# Patient Record
Sex: FEMALE | Race: WHITE | URBAN - METROPOLITAN AREA
[De-identification: names, ages, dates, MRNs, and addresses within clinical notes are randomized per-mention and may not be internally consistent; named-entity substitution may affect disease eponyms.]

---

## 2018-11-16 ENCOUNTER — OFFICE VISIT (OUTPATIENT)
Dept: PRIMARY CARE CLINIC | Age: 83
End: 2018-11-16

## 2018-11-16 VITALS
TEMPERATURE: 97.4 F | BODY MASS INDEX: 21.54 KG/M2 | SYSTOLIC BLOOD PRESSURE: 138 MMHG | OXYGEN SATURATION: 98 % | DIASTOLIC BLOOD PRESSURE: 72 MMHG | HEART RATE: 70 BPM | HEIGHT: 58 IN | WEIGHT: 102.6 LBS | RESPIRATION RATE: 16 BRPM

## 2018-11-16 DIAGNOSIS — Z71.89 ACP (ADVANCE CARE PLANNING): ICD-10-CM

## 2018-11-16 DIAGNOSIS — R73.02 IGT (IMPAIRED GLUCOSE TOLERANCE): ICD-10-CM

## 2018-11-16 DIAGNOSIS — Z00.00 MEDICARE ANNUAL WELLNESS VISIT, SUBSEQUENT: Primary | ICD-10-CM

## 2018-11-16 DIAGNOSIS — E78.2 MIXED HYPERLIPIDEMIA: ICD-10-CM

## 2018-11-16 RX ORDER — SIMVASTATIN 20 MG/1
TABLET, FILM COATED ORAL
COMMUNITY
End: 2018-11-21 | Stop reason: SDUPTHER

## 2018-11-16 NOTE — PROGRESS NOTES
Florencio Choi is a 82 y.o. female and presents for Annual Medicare Wellness Visit. Assessment of cognitive impairment: Alert and oriented x 3 [facility] Depression Screen: PHQ over the last two weeks 2018 Little interest or pleasure in doing things Not at all Feeling down, depressed, irritable, or hopeless Not at all Total Score PHQ 2 0 Fall Risk Assessment:   
Fall Risk Assessment, last 12 mths 2018 Able to walk? Yes Fall in past 12 months? No  
Fall with injury? -  
Number of falls in past 12 months - Fall Risk Score -  
 
 
Abuse Screen:  
Abuse Screening Questionnaire 2018 Do you ever feel afraid of your partner? Marianne Portal Are you in a relationship with someone who physically or mentally threatens you? Marianne Portal Is it safe for you to go home? Senia Baldwin Activities of Daily Living: 
Partial assistance. Requires assistance with: driving Patient handle his/her own medications Use of pill box  yes Activities of Daily Living: ADL Assessment 2018 Feeding yourself No Help Needed Getting from bed to chair No Help Needed Getting dressed No Help Needed Bathing or showering No Help Needed Walk across the room (includes cane/walker) No Help Needed Using the telphone No Help Needed Taking your medications No Help Needed Preparing meals No Help Needed Managing money (expenses/bills) No Help Needed Moderately strenuous housework (laundry) No Help Needed Shopping for personal items (toiletries/medicines) No Help Needed Shopping for groceries No Help Needed Driving Help Needed Climbing a flight of stairs No Help Needed Getting to places beyond walking distances No Help Needed Health Maintenance:Daily Aspirin: no and recommended to start daily 81mg Bone Density: never done Glaucoma Screenin Immunizations:  
 Tetanus: declined  Influenza: declined Shingles: declined  PPSV-23: declined  Prevnar-13: declined Cancer screening: Cervical:n/a  Breast: refused. Colon: patient declined  Prostate: n/a Alcohol Risk Screen: On any occasion during the past 3 months, have you had more than 3 drinks(female) or 4 drinks (male) containing alcohol in one?  no 
Do you average more than 7 drinks (female) or 14 drinks (male) per week?  no 
Type and amount:none Hearing Loss:Has hearing aids but doesn't wear them Vision Loss:  
Wears glasses, contact lenses, or have any other visual impairment  Wears glasses Adult Nutrition Screen: No risk factors noted. Advance Care Planning: End of Life Planning: has NO advanced directive  - paperwork given SergoAnai Kenneymarine Mejia ACP-Facilitator appointment Medications/Allergies: Reviewed with patient Prior to Admission medications Medication Sig Start Date End Date Taking? Authorizing Provider  
simvastatin (ZOCOR) 20 mg tablet Take  by mouth nightly. Yes Provider, Historical  
hydrocortisone (HYTONE) 2.5 % ointment Apply  to affected area two (2) times a day. use thin layer 6/28/16   Michael Espinoza MD  
ketoconazole (NIZORAL) 2 % topical cream Apply  to affected area daily. 11/25/14   Apolinar Luz NP  
OMEGA-3 FATTY ACIDS (OMEGA 3 PO) Take 1 Tab by mouth daily. 9/22/10   Provider, Historical  
 
 
No Known Allergies Objective: 
Visit Vitals /72 (BP 1 Location: Left arm, BP Patient Position: Sitting) Pulse 70 Temp 97.4 °F (36.3 °C) (Oral) Resp 16 Ht 4' 10\" (1.473 m) Wt 102 lb 9.6 oz (46.5 kg) SpO2 98% BMI 21.44 kg/m² Body mass index is 21.44 kg/m². Problem List: Reviewed with patient and discussed risk factors. Patient Active Problem List  
Diagnosis Code  Hyperlipidemia E78.5  Recurrent UTI N39.0  
 
 
PSH: Reviewed with patient Past Surgical History:  
Procedure Laterality Date  HX APPENDECTOMY  HX GYN    
 hysterectomy SH: Reviewed with patient Social History Tobacco Use  Smoking status: Never Smoker  Smokeless tobacco: Never Used Substance Use Topics  Alcohol use: No  
 Drug use: No  
 
 
FH: Reviewed with patient Family History Family history unknown: Yes Current medical providers:   
Patient Care Team: 
Robert Keys MD as PCP - General 
Plan:   
Diagnoses and all orders for this visit: 
 
Medicare annual wellness visit, subsequent Immunization & Health screening discussed with him. Daughter is helping with translation as they refused blue phone. They refused all the immunizations & well aware of consequences. ACP (advance care planning) Paper work given . Recommended making appointment with [facility]. Mixed hyperlipidemia 
-     CBC W/O DIFF 
-     LIPID PANEL 
-     METABOLIC PANEL, COMPREHENSIVE 
 
IGT (impaired glucose tolerance) 
-     HEMOGLOBIN A1C WITH EAG Orders Placed This Encounter  simvastatin (ZOCOR) 20 mg tablet Health Maintenance Topic Date Due  
 DTaP/Tdap/Td series (1 - Tdap) 02/25/1958  Shingrix Vaccine Age 50> (1 of 2) 02/25/1987  GLAUCOMA SCREENING Q2Y  02/25/2002  Bone Densitometry (Dexa) Screening  02/25/2002  Pneumococcal 65+ Low/Medium Risk (1 of 2 - PCV13) 02/25/2002  MEDICARE YEARLY EXAM  03/14/2018  Influenza Age 5 to Adult  08/01/2018  COLONOSCOPY  07/05/2026 Urinary/ Fecal Incontinence: no 
 
Regular physical exercise: yes,walks daily. Patient verbalized understanding of information presented. AVS and Medicare Part B Preventive Services Table printed and given to pt and reviewed. See table for findings under Recommendation and Scheduled. All of the patient's questions were answered.

## 2018-11-17 LAB
ALBUMIN SERPL-MCNC: 4 G/DL (ref 3.5–4.7)
ALBUMIN/GLOB SERPL: 1.5 {RATIO} (ref 1.2–2.2)
ALP SERPL-CCNC: 102 IU/L (ref 39–117)
ALT SERPL-CCNC: 12 IU/L (ref 0–32)
AST SERPL-CCNC: 15 IU/L (ref 0–40)
BILIRUB SERPL-MCNC: 0.3 MG/DL (ref 0–1.2)
BUN SERPL-MCNC: 10 MG/DL (ref 8–27)
BUN/CREAT SERPL: 16 (ref 12–28)
CALCIUM SERPL-MCNC: 8.8 MG/DL (ref 8.7–10.3)
CHLORIDE SERPL-SCNC: 101 MMOL/L (ref 96–106)
CHOLEST SERPL-MCNC: 200 MG/DL (ref 100–199)
CO2 SERPL-SCNC: 28 MMOL/L (ref 20–29)
CREAT SERPL-MCNC: 0.64 MG/DL (ref 0.57–1)
ERYTHROCYTE [DISTWIDTH] IN BLOOD BY AUTOMATED COUNT: 14.5 % (ref 12.3–15.4)
EST. AVERAGE GLUCOSE BLD GHB EST-MCNC: 114 MG/DL
GLOBULIN SER CALC-MCNC: 2.6 G/DL (ref 1.5–4.5)
GLUCOSE SERPL-MCNC: 86 MG/DL (ref 65–99)
HBA1C MFR BLD: 5.6 % (ref 4.8–5.6)
HCT VFR BLD AUTO: 36.6 % (ref 34–46.6)
HDLC SERPL-MCNC: 63 MG/DL
HGB BLD-MCNC: 11.8 G/DL (ref 11.1–15.9)
LDLC SERPL CALC-MCNC: 116 MG/DL (ref 0–99)
MCH RBC QN AUTO: 28.2 PG (ref 26.6–33)
MCHC RBC AUTO-ENTMCNC: 32.2 G/DL (ref 31.5–35.7)
MCV RBC AUTO: 88 FL (ref 79–97)
PLATELET # BLD AUTO: 284 X10E3/UL (ref 150–379)
POTASSIUM SERPL-SCNC: 4 MMOL/L (ref 3.5–5.2)
PROT SERPL-MCNC: 6.6 G/DL (ref 6–8.5)
RBC # BLD AUTO: 4.18 X10E6/UL (ref 3.77–5.28)
SODIUM SERPL-SCNC: 140 MMOL/L (ref 134–144)
TRIGL SERPL-MCNC: 103 MG/DL (ref 0–149)
VLDLC SERPL CALC-MCNC: 21 MG/DL (ref 5–40)
WBC # BLD AUTO: 6.5 X10E3/UL (ref 3.4–10.8)

## 2018-11-18 NOTE — PROGRESS NOTES
Please let her daughter know her Cholesterol is going up. She needs to stay with one PCP for her care.

## 2018-11-21 ENCOUNTER — TELEPHONE (OUTPATIENT)
Dept: PRIMARY CARE CLINIC | Age: 83
End: 2018-11-21

## 2018-11-21 RX ORDER — SIMVASTATIN 20 MG/1
TABLET, FILM COATED ORAL
Qty: 90 TAB | Refills: 3 | Status: SHIPPED | OUTPATIENT
Start: 2018-11-21

## 2018-11-21 RX ORDER — SIMVASTATIN 20 MG/1
20 TABLET, FILM COATED ORAL
Qty: 30 TAB | Refills: 3 | Status: SHIPPED | OUTPATIENT
Start: 2018-11-21 | End: 2018-11-21 | Stop reason: SDUPTHER

## 2022-03-12 ENCOUNTER — HOSPITAL ENCOUNTER (EMERGENCY)
Age: 87
Discharge: HOME | End: 2022-03-12
Attending: EMERGENCY MEDICINE | Admitting: EMERGENCY MEDICINE
Payer: MEDICARE

## 2022-03-12 VITALS
HEIGHT: 60 IN | SYSTOLIC BLOOD PRESSURE: 119 MMHG | DIASTOLIC BLOOD PRESSURE: 90 MMHG | TEMPERATURE: 97.5 F | RESPIRATION RATE: 20 BRPM | BODY MASS INDEX: 20.03 KG/M2 | WEIGHT: 102 LBS | HEART RATE: 76 BPM | OXYGEN SATURATION: 96 %

## 2022-03-12 DIAGNOSIS — H81.392 PERIPHERAL VERTIGO, LEFT: Primary | ICD-10-CM

## 2022-03-12 LAB
ALBUMIN SERPL-MCNC: 3.6 G/DL (ref 3.5–5)
ALBUMIN/GLOB SERPL: 0.9 {RATIO} (ref 1.1–2.2)
ALP SERPL-CCNC: 112 U/L (ref 45–117)
ALT SERPL-CCNC: 17 U/L (ref 12–78)
ANION GAP SERPL CALC-SCNC: 2 MMOL/L (ref 5–15)
AST SERPL-CCNC: 8 U/L (ref 15–37)
ATRIAL RATE: 78 BPM
BASOPHILS # BLD: 0 K/UL (ref 0–0.1)
BASOPHILS NFR BLD: 0 % (ref 0–1)
BILIRUB SERPL-MCNC: 0.4 MG/DL (ref 0.2–1)
BUN SERPL-MCNC: 10 MG/DL (ref 6–20)
BUN/CREAT SERPL: 17 (ref 12–20)
CALCIUM SERPL-MCNC: 9 MG/DL (ref 8.5–10.1)
CALCULATED P AXIS, ECG09: 47 DEGREES
CALCULATED R AXIS, ECG10: 37 DEGREES
CALCULATED T AXIS, ECG11: 54 DEGREES
CHLORIDE SERPL-SCNC: 103 MMOL/L (ref 97–108)
CO2 SERPL-SCNC: 31 MMOL/L (ref 21–32)
COMMENT, HOLDF: NORMAL
CREAT SERPL-MCNC: 0.59 MG/DL (ref 0.55–1.02)
DIAGNOSIS, 93000: NORMAL
DIFFERENTIAL METHOD BLD: ABNORMAL
EOSINOPHIL # BLD: 0.1 K/UL (ref 0–0.4)
EOSINOPHIL NFR BLD: 1 % (ref 0–7)
ERYTHROCYTE [DISTWIDTH] IN BLOOD BY AUTOMATED COUNT: 13.2 % (ref 11.5–14.5)
GLOBULIN SER CALC-MCNC: 3.9 G/DL (ref 2–4)
GLUCOSE SERPL-MCNC: 103 MG/DL (ref 65–100)
HCT VFR BLD AUTO: 39.7 % (ref 35–47)
HGB BLD-MCNC: 12.5 G/DL (ref 11.5–16)
IMM GRANULOCYTES # BLD AUTO: 0.1 K/UL (ref 0–0.04)
IMM GRANULOCYTES NFR BLD AUTO: 1 % (ref 0–0.5)
LYMPHOCYTES # BLD: 0.9 K/UL (ref 0.8–3.5)
LYMPHOCYTES NFR BLD: 11 % (ref 12–49)
MCH RBC QN AUTO: 28 PG (ref 26–34)
MCHC RBC AUTO-ENTMCNC: 31.5 G/DL (ref 30–36.5)
MCV RBC AUTO: 89 FL (ref 80–99)
MONOCYTES # BLD: 0.6 K/UL (ref 0–1)
MONOCYTES NFR BLD: 7 % (ref 5–13)
NEUTS SEG # BLD: 6.7 K/UL (ref 1.8–8)
NEUTS SEG NFR BLD: 80 % (ref 32–75)
NRBC # BLD: 0 K/UL (ref 0–0.01)
NRBC BLD-RTO: 0 PER 100 WBC
P-R INTERVAL, ECG05: 176 MS
PLATELET # BLD AUTO: 338 K/UL (ref 150–400)
PMV BLD AUTO: 10.1 FL (ref 8.9–12.9)
POTASSIUM SERPL-SCNC: 3.8 MMOL/L (ref 3.5–5.1)
PROT SERPL-MCNC: 7.5 G/DL (ref 6.4–8.2)
Q-T INTERVAL, ECG07: 356 MS
QRS DURATION, ECG06: 68 MS
QTC CALCULATION (BEZET), ECG08: 405 MS
RBC # BLD AUTO: 4.46 M/UL (ref 3.8–5.2)
SAMPLES BEING HELD,HOLD: NORMAL
SODIUM SERPL-SCNC: 136 MMOL/L (ref 136–145)
TROPONIN-HIGH SENSITIVITY: 12 NG/L (ref 0–51)
TROPONIN-HIGH SENSITIVITY: 13 NG/L (ref 0–51)
VENTRICULAR RATE, ECG03: 78 BPM
WBC # BLD AUTO: 8.3 K/UL (ref 3.6–11)

## 2022-03-12 PROCEDURE — 85025 COMPLETE CBC W/AUTO DIFF WBC: CPT

## 2022-03-12 PROCEDURE — 36415 COLL VENOUS BLD VENIPUNCTURE: CPT

## 2022-03-12 PROCEDURE — 93005 ELECTROCARDIOGRAM TRACING: CPT

## 2022-03-12 PROCEDURE — 99284 EMERGENCY DEPT VISIT MOD MDM: CPT

## 2022-03-12 PROCEDURE — 80053 COMPREHEN METABOLIC PANEL: CPT

## 2022-03-12 PROCEDURE — 84484 ASSAY OF TROPONIN QUANT: CPT

## 2022-03-12 RX ORDER — MECLIZINE HYDROCHLORIDE 25 MG/1
25 TABLET ORAL
Qty: 15 TABLET | Refills: 0 | Status: SHIPPED | OUTPATIENT
Start: 2022-03-12 | End: 2022-03-22

## 2022-03-12 NOTE — ED TRIAGE NOTES
Pt arrives with daughter from home for c/o dizziness that was noticed at 2 am when patient got up to use bathroom. Went to bed approx 830 LTKW.

## 2022-03-12 NOTE — ED NOTES
I have reviewed discharge instructions with the patient and caregiver. The patient and caregiver verbalized understanding. Instructions provided in [address] and English per pt/caregiver request. Follow care discussed.

## 2022-03-13 NOTE — ED PROVIDER NOTES
The history is provided by the patient. Dizziness  This is a new problem. The current episode started 12 to 24 hours ago. The problem has been resolved. Affected Side: left directional vertigo symptoms. Primary symptoms include loss of balance (when episode occured ). Pertinent negatives include no focal weakness, no loss of sensation, no movement disorder, no mental status change and no disorientation. There has been no fever. Pertinent negatives include no chest pain, no vomiting, no altered mental status and no confusion. There were no medications administered prior to arrival.        Past Medical History:   Diagnosis Date    Anemia NEC     Hypercholesterolemia        Past Surgical History:   Procedure Laterality Date    HX APPENDECTOMY      HX GYN      hysterectomy         Family History:   Family history unknown: Yes       Social History     Socioeconomic History    Marital status: UNKNOWN     Spouse name: Not on file    Number of children: Not on file    Years of education: Not on file    Highest education level: Not on file   Occupational History    Not on file   Tobacco Use    Smoking status: Never Smoker    Smokeless tobacco: Never Used   Substance and Sexual Activity    Alcohol use: No    Drug use: No    Sexual activity: Never   Other Topics Concern    Not on file   Social History Narrative    Not on file     Social Determinants of Health     Financial Resource Strain:     Difficulty of Paying Living Expenses: Not on file   Food Insecurity:     Worried About Running Out of Food in the Last Year: Not on file    Lindsey of Food in the Last Year: Not on file   Transportation Needs:     Lack of Transportation (Medical): Not on file    Lack of Transportation (Non-Medical):  Not on file   Physical Activity:     Days of Exercise per Week: Not on file    Minutes of Exercise per Session: Not on file   Stress:     Feeling of Stress : Not on file   Social Connections:     Frequency of Communication with Friends and Family: Not on file    Frequency of Social Gatherings with Friends and Family: Not on file    Attends Yazidism Services: Not on file    Active Member of Clubs or Organizations: Not on file    Attends Club or Organization Meetings: Not on file    Marital Status: Not on file   Intimate Partner Violence:     Fear of Current or Ex-Partner: Not on file    Emotionally Abused: Not on file    Physically Abused: Not on file    Sexually Abused: Not on file   Housing Stability:     Unable to Pay for Housing in the Last Year: Not on file    Number of [place] in the Last Year: Not on file    Unstable Housing in the Last Year: Not on file         ALLERGIES: Patient has no known allergies. Review of Systems   Cardiovascular: Negative for chest pain. Gastrointestinal: Negative for vomiting. Neurological: Positive for dizziness and loss of balance (when episode occured ). Negative for focal weakness. Psychiatric/Behavioral: Negative for confusion. All other systems reviewed and are negative. Vitals:    03/12/22 1329 03/12/22 1340 03/12/22 1500 03/12/22 1620   BP: (!) 137/122  128/64 (!) 119/90   Pulse: 87  73 76   Resp: 20  16 20   Temp: 97.5 °F (36.4 °C)      SpO2: 96%  97% 96%   Weight:  46.3 kg (102 lb)     Height:  5' (1.524 m)              Physical Exam  Vitals and nursing note reviewed. Constitutional:       General: She is not in acute distress. Appearance: She is well-developed. HENT:      Head: Normocephalic and atraumatic. Eyes:      Conjunctiva/sclera: Conjunctivae normal.   Cardiovascular:      Rate and Rhythm: Normal rate and regular rhythm. Heart sounds: Normal heart sounds. Pulmonary:      Effort: Pulmonary effort is normal. No respiratory distress. Breath sounds: Normal breath sounds. Abdominal:      General: There is no distension. Musculoskeletal:         General: No deformity. Normal range of motion.       Cervical back: Neck supple. Skin:     General: Skin is warm and dry. Neurological:      Mental Status: She is alert. Cranial Nerves: No cranial nerve deficit. Motor: Motor function is intact. Coordination: Coordination is intact. Comments: Negative head impulse, no nystagmus, normal test of skew. Psychiatric:         Behavior: Behavior normal.          [facility]  ED Course as of 03/12/22 2231   Sat Mar 12, 2022   1358 EKG 1155: Rate 78, Normal sinus rhythm, nonspecific ST abnormality. Otherwise normal EKG. No previous tracings available for review. [DK]      ED Course User Index  [DK] Lucho Cannon MD     82 y.o. female presents with dizziness feeling overnight. Has left predominant vertigo description exacerbated by movement and difficulty with balance when it occurred but she is resolved. Suspect BPPV. Will treat empirically with meclizine therapy PRN and instructions for exercises. Follow up with PCP and ENT as needed for re-evaluation. Return precautions were discussed for worsening or new concerning symptoms.      Procedures